# Patient Record
Sex: MALE
[De-identification: names, ages, dates, MRNs, and addresses within clinical notes are randomized per-mention and may not be internally consistent; named-entity substitution may affect disease eponyms.]

---

## 2022-01-03 ENCOUNTER — APPOINTMENT (OUTPATIENT)
Dept: OTOLARYNGOLOGY | Facility: CLINIC | Age: 27
End: 2022-01-03
Payer: COMMERCIAL

## 2022-01-03 VITALS
BODY MASS INDEX: 27.49 KG/M2 | SYSTOLIC BLOOD PRESSURE: 129 MMHG | HEART RATE: 63 BPM | WEIGHT: 192 LBS | HEIGHT: 70 IN | DIASTOLIC BLOOD PRESSURE: 79 MMHG | TEMPERATURE: 98 F | OXYGEN SATURATION: 97 %

## 2022-01-03 DIAGNOSIS — Z82.49 FAMILY HISTORY OF ISCHEMIC HEART DISEASE AND OTHER DISEASES OF THE CIRCULATORY SYSTEM: ICD-10-CM

## 2022-01-03 DIAGNOSIS — F17.290 NICOTINE DEPENDENCE, OTHER TOBACCO PRODUCT, UNCOMPLICATED: ICD-10-CM

## 2022-01-03 DIAGNOSIS — J03.90 ACUTE TONSILLITIS, UNSPECIFIED: ICD-10-CM

## 2022-01-03 DIAGNOSIS — Z82.3 FAMILY HISTORY OF STROKE: ICD-10-CM

## 2022-01-03 DIAGNOSIS — H61.22 IMPACTED CERUMEN, LEFT EAR: ICD-10-CM

## 2022-01-03 DIAGNOSIS — Z72.89 OTHER PROBLEMS RELATED TO LIFESTYLE: ICD-10-CM

## 2022-01-03 DIAGNOSIS — Z78.9 OTHER SPECIFIED HEALTH STATUS: ICD-10-CM

## 2022-01-03 PROBLEM — Z00.00 ENCOUNTER FOR PREVENTIVE HEALTH EXAMINATION: Status: ACTIVE | Noted: 2022-01-03

## 2022-01-03 PROCEDURE — 99204 OFFICE O/P NEW MOD 45 MIN: CPT | Mod: 25

## 2022-01-03 PROCEDURE — 69210 REMOVE IMPACTED EAR WAX UNI: CPT

## 2022-01-03 PROCEDURE — 31231 NASAL ENDOSCOPY DX: CPT | Mod: 52

## 2022-01-03 RX ORDER — AMOXICILLIN AND CLAVULANATE POTASSIUM 875; 125 MG/1; MG/1
875-125 TABLET, COATED ORAL
Qty: 20 | Refills: 0 | Status: ACTIVE | COMMUNITY
Start: 2022-01-03 | End: 1900-01-01

## 2022-01-03 RX ORDER — FLUTICASONE PROPIONATE 50 UG/1
50 SPRAY, METERED NASAL DAILY
Qty: 1 | Refills: 3 | Status: ACTIVE | COMMUNITY
Start: 2022-01-03 | End: 1900-01-01

## 2022-01-03 RX ORDER — OMEPRAZOLE 40 MG/1
40 CAPSULE, DELAYED RELEASE ORAL
Qty: 30 | Refills: 0 | Status: ACTIVE | COMMUNITY
Start: 2022-01-03 | End: 1900-01-01

## 2022-01-03 NOTE — REASON FOR VISIT
[Initial Evaluation] : an initial evaluation for [FreeTextEntry2] : nasal obstruction, possible sleep apnea, snoring

## 2022-01-03 NOTE — PROCEDURE
[Cerumen Impaction] : Cerumen Impaction [Same] : same as the Pre Op Dx. [] : Removal of Cerumen [Complicated Symptoms] : complicated symptoms requiring more thorough examination than provided by mirror [Interarytenoid Edema] : interarytenoid area edematous [Anterior rhinoscopy insufficient to account for symptoms] : anterior rhinoscopy insufficient to account for symptoms [None] : none [Flexible Endoscope] : examined with the flexible endoscope [Deviated to the Rt] : deviated to the right [Normal] : posterior cricoid area had healthy pink mucosa in the interarytenoid area and the esophageal inlet [Posterior Lesion] : posterior lesion [Serial Number: ___] : Serial Number: [unfilled] [FreeTextEntry6] : done for nasal obstruction; also advanced to larynx for ady - iah cw rl\par lingual tonsillitis  [de-identified] : lingual tonsillitis, interarytenoid edema  [FreeTextEntry5] : R nl, L copious cerumen removed atraumatically with suction, TMs intact

## 2022-01-03 NOTE — PHYSICAL EXAM
[Midline] : trachea located in midline position [Normal] : no rashes [Nasal Endoscopy Performed] : nasal endoscopy was performed, see procedure section for findings [de-identified] : R nl, L copious cerumen removed atraumatically with suction, TMs intact [de-identified] : R nl, L copious cerumen removed atraumatically with suction, TMs intact [de-identified] : Uvula to base of tongue  [de-identified] : gait steady

## 2022-01-03 NOTE — HISTORY OF PRESENT ILLNESS
[de-identified] : 25 y/o M presenting today with sx of nasal obstruction since summer 2018. No inciting event.  Over this past year his nasal obstruction has been a lot more bothersome, worse on R side than L. He initially saw an ENT at [place] who rec septoplasty, and is scheduled for sometime later this month. A second ent told him his nasal problem was different and septoplasty would not help. Pt also complaining that he feels fatigued during the day and like he is not functioning at his fullest capacity. Pt admits to snoring and was told his snoring has gotten louder and worse over the past year. Sometimes he wakes up in the middle of the night choking/ unable to catch his breath/ feeling like someone has been strangling him and he wakes up in the morning with a headache.  He has had some recent weight gain in past 2 months, but has been working out. No new life stressors. No FH pertinent to cc. Former smoker 5 pack years, quit when he was 22. No stomach upset. Denies fevers, chills, sweats.

## 2022-01-03 NOTE — ASSESSMENT
[FreeTextEntry1] : 1. lingual tonsillitis\par -augmentin\par 2. Reflux Laryngitis\par -diet/ mechanical precautions\par -Prilosec \par 3.ady\par -sleep study \par 4.deviated septum to R\par -ct scan, rec pt hold off on outside septoplasty until he has imaging of sinuses, pt agrees\par -flonase\par RTC with CT/ sleep study to review findings \par cerumen removed\par \par

## 2022-01-06 ENCOUNTER — APPOINTMENT (OUTPATIENT)
Dept: OTOLARYNGOLOGY | Facility: CLINIC | Age: 27
End: 2022-01-06
Payer: COMMERCIAL

## 2022-01-06 VITALS
WEIGHT: 192 LBS | DIASTOLIC BLOOD PRESSURE: 84 MMHG | BODY MASS INDEX: 27.49 KG/M2 | OXYGEN SATURATION: 99 % | HEIGHT: 70 IN | TEMPERATURE: 98 F | HEART RATE: 56 BPM | SYSTOLIC BLOOD PRESSURE: 130 MMHG

## 2022-01-06 DIAGNOSIS — R06.83 SNORING: ICD-10-CM

## 2022-01-06 DIAGNOSIS — G47.33 OBSTRUCTIVE SLEEP APNEA (ADULT) (PEDIATRIC): ICD-10-CM

## 2022-01-06 PROCEDURE — 99214 OFFICE O/P EST MOD 30 MIN: CPT

## 2022-01-06 NOTE — REASON FOR VISIT
[Subsequent Evaluation] : a subsequent evaluation for [FreeTextEntry2] : nasal obstruction, ady, reflux laryngiis

## 2022-01-06 NOTE — ASSESSMENT
[FreeTextEntry1] : ds/ar - I recommended flonase, smr turbs\par technique risks benefits and alts discussed\par he will consider and call if interested\par \par ady continue diet mech omep for reflux\par rtc with sleep study results

## 2022-01-06 NOTE — DATA REVIEWED
[de-identified] : ct reviewed with pt - ds ant to r posterior to l and turb engorgement, absent r frontal sinus

## 2022-01-06 NOTE — HISTORY OF PRESENT ILLNESS
[de-identified] : followup 25 yo m with nasal obstruction, reflux laryngitis, snoring. He had sinus ct and is here to review results. He is scheduled for sleep study. He has seen an ent who recommends smr and a plastic surgeon who recommended smr and repair of nasal valves.

## 2022-01-06 NOTE — PHYSICAL EXAM
[Normal] : no neck adenopathy [de-identified] : shelby vallejo; ar [de-identified] : gait steady

## 2022-01-27 ENCOUNTER — APPOINTMENT (OUTPATIENT)
Dept: SLEEP CENTER | Facility: HOME HEALTH | Age: 27
End: 2022-01-27
Payer: COMMERCIAL

## 2022-01-27 ENCOUNTER — OUTPATIENT (OUTPATIENT)
Dept: OUTPATIENT SERVICES | Facility: HOSPITAL | Age: 27
LOS: 1 days | End: 2022-01-27
Payer: COMMERCIAL

## 2022-01-27 PROCEDURE — 95800 SLP STDY UNATTENDED: CPT | Mod: 26

## 2022-01-27 PROCEDURE — 95800 SLP STDY UNATTENDED: CPT

## 2022-01-28 DIAGNOSIS — G47.33 OBSTRUCTIVE SLEEP APNEA (ADULT) (PEDIATRIC): ICD-10-CM

## 2022-02-04 ENCOUNTER — NON-APPOINTMENT (OUTPATIENT)
Age: 27
End: 2022-02-04

## 2022-02-16 ENCOUNTER — APPOINTMENT (OUTPATIENT)
Dept: OTOLARYNGOLOGY | Facility: CLINIC | Age: 27
End: 2022-02-16
Payer: COMMERCIAL

## 2022-02-16 VITALS
TEMPERATURE: 98.3 F | OXYGEN SATURATION: 99 % | HEART RATE: 62 BPM | SYSTOLIC BLOOD PRESSURE: 129 MMHG | DIASTOLIC BLOOD PRESSURE: 83 MMHG

## 2022-02-16 DIAGNOSIS — J04.0 ACUTE LARYNGITIS: ICD-10-CM

## 2022-02-16 DIAGNOSIS — K21.9 ACUTE LARYNGITIS: ICD-10-CM

## 2022-02-16 DIAGNOSIS — J34.3 HYPERTROPHY OF NASAL TURBINATES: ICD-10-CM

## 2022-02-16 DIAGNOSIS — J34.2 DEVIATED NASAL SEPTUM: ICD-10-CM

## 2022-02-16 PROCEDURE — 99214 OFFICE O/P EST MOD 30 MIN: CPT

## 2022-02-16 NOTE — REASON FOR VISIT
[Subsequent Evaluation] : a subsequent evaluation for [FreeTextEntry2] : deviated septum perceived ady snoring, reflux laryngitis

## 2022-02-16 NOTE — PHYSICAL EXAM
[] : septum deviated bilaterally [Normal] : assessment of respiratory effort is normal [de-identified] : mild valves [de-identified] : gait steady

## 2022-02-16 NOTE — HISTORY OF PRESENT ILLNESS
[de-identified] : 6 week followup for this 25 yo M with deviate septum, nasal obstruction, reflux laryngitis, snoring, perceived ady. He had had sleep study and is here to review result. He reports he is breathing much better at night on reflux therapy. He continues to have nasal obstruction. Fractured nose in rugby game many yrs ago.

## 2022-02-16 NOTE — ASSESSMENT
[FreeTextEntry1] : snoring better, no asa reflux improved - continue present regimen\par ds/inf turb hypertrophy\par discussed risks benefits and alts to septum/turbs - he also is considering whether to do nasal valve surgery - I asked staff to give him contact info for Dr Staley\par He said he would call if he wishes to do smr/turbs

## 2022-02-16 NOTE — DATA REVIEWED
[de-identified] : ct images and report reviewed with pt [de-identified] : negative, reviewed with pt

## 2022-04-11 PROBLEM — J04.0 REFLUX LARYNGITIS: Status: ACTIVE | Noted: 2022-01-03

## 2022-04-26 ENCOUNTER — TRANSCRIPTION ENCOUNTER (OUTPATIENT)
Age: 27
End: 2022-04-26

## 2022-05-25 ENCOUNTER — NON-APPOINTMENT (OUTPATIENT)
Age: 27
End: 2022-05-25

## 2023-10-06 ENCOUNTER — APPOINTMENT (OUTPATIENT)
Dept: OTOLARYNGOLOGY | Facility: CLINIC | Age: 28
End: 2023-10-06

## 2023-10-13 ENCOUNTER — APPOINTMENT (OUTPATIENT)
Dept: OTOLARYNGOLOGY | Facility: CLINIC | Age: 28
End: 2023-10-13
Payer: SELF-PAY

## 2023-10-13 VITALS
HEIGHT: 70 IN | WEIGHT: 192 LBS | OXYGEN SATURATION: 97 % | BODY MASS INDEX: 27.49 KG/M2 | SYSTOLIC BLOOD PRESSURE: 116 MMHG | HEART RATE: 64 BPM | TEMPERATURE: 97.9 F | DIASTOLIC BLOOD PRESSURE: 72 MMHG

## 2023-10-13 DIAGNOSIS — J34.89 OTHER SPECIFIED DISORDERS OF NOSE AND NASAL SINUSES: ICD-10-CM

## 2023-10-13 PROCEDURE — 99213 OFFICE O/P EST LOW 20 MIN: CPT | Mod: NC

## 2024-03-15 ENCOUNTER — APPOINTMENT (OUTPATIENT)
Dept: OTOLARYNGOLOGY | Facility: CLINIC | Age: 29
End: 2024-03-15

## 2024-09-26 ENCOUNTER — APPOINTMENT (OUTPATIENT)
Dept: OTOLARYNGOLOGY | Facility: CLINIC | Age: 29
End: 2024-09-26

## 2024-10-04 ENCOUNTER — APPOINTMENT (OUTPATIENT)
Dept: OTOLARYNGOLOGY | Facility: CLINIC | Age: 29
End: 2024-10-04
Payer: SELF-PAY

## 2024-10-04 VITALS
HEIGHT: 70 IN | WEIGHT: 210 LBS | HEART RATE: 69 BPM | OXYGEN SATURATION: 97 % | SYSTOLIC BLOOD PRESSURE: 134 MMHG | BODY MASS INDEX: 30.06 KG/M2 | DIASTOLIC BLOOD PRESSURE: 86 MMHG

## 2024-10-04 DIAGNOSIS — R68.89 OTHER GENERAL SYMPTOMS AND SIGNS: ICD-10-CM

## 2024-10-04 PROCEDURE — 99212 OFFICE O/P EST SF 10 MIN: CPT

## 2024-10-05 PROBLEM — R68.89 EYE, EAR, NOSE, AND THROAT SYMPTOM: Status: ACTIVE | Noted: 2024-10-05

## 2024-10-05 NOTE — HISTORY OF PRESENT ILLNESS
[de-identified] : 1 year follow up visit for this 30 y/o M with h/o septorhinoplasty (April 2022, Dr. Flores). He is concerned that too much of his anterior septal cartilage was removed resulting in R nasal valve collapse.  He said that he spoke with multiple plastic surgeons who were not able to explain why he has a R nasal valve collapse. He requests that I look at his CT to determine integrity of his septum. I explained I could look at his ct but am not sure I could see the anterior septum issue he was referring to but would try. He asked me to hold off as he did not want to incur costs unless I could guarantee a result. offered referral to a plastic surgeon as his complaint is nasal collapse and valving. He preferred not to be examined if there would be costs incurred.

## 2024-10-05 NOTE — ASSESSMENT
[FreeTextEntry1] : -provided recommendation to Dr. Radha Hoskins for re-evaluation as he requested plastic surgeon's eval no

## 2024-10-05 NOTE — HISTORY OF PRESENT ILLNESS
[de-identified] : 1 year follow up visit for this 28 y/o M with h/o septorhinoplasty (April 2022, Dr. Flores). He is concerned that too much of his anterior septal cartilage was removed resulting in R nasal valve collapse.  He said that he spoke with multiple plastic surgeons who were not able to explain why he has a R nasal valve collapse. He requests that I look at his CT to determine integrity of his septum. I explained I could look at his ct but am not sure I could see the anterior septum issue he was referring to but would try. He asked me to hold off as he did not want to incur costs unless I could guarantee a result. offered referral to a plastic surgeon as his complaint is nasal collapse and valving. He preferred not to be examined if there would be costs incurred.

## 2024-10-05 NOTE — REASON FOR VISIT
[Subsequent Evaluation] : a subsequent evaluation for [FreeTextEntry2] : post septorhinoplasty concerns

## 2024-10-05 NOTE — ASSESSMENT
[FreeTextEntry1] : -provided recommendation to Dr. Radha Hoskins for re-evaluation as he requested plastic surgeon's eval

## 2024-10-07 ENCOUNTER — NON-APPOINTMENT (OUTPATIENT)
Age: 29
End: 2024-10-07

## 2024-10-07 ENCOUNTER — APPOINTMENT (OUTPATIENT)
Dept: OTOLARYNGOLOGY | Facility: CLINIC | Age: 29
End: 2024-10-07
Payer: COMMERCIAL

## 2024-10-07 VITALS
SYSTOLIC BLOOD PRESSURE: 161 MMHG | HEIGHT: 70 IN | OXYGEN SATURATION: 97 % | WEIGHT: 210 LBS | TEMPERATURE: 97.8 F | BODY MASS INDEX: 30.06 KG/M2 | HEART RATE: 82 BPM | DIASTOLIC BLOOD PRESSURE: 107 MMHG

## 2024-10-07 DIAGNOSIS — Z98.890 OTHER SPECIFIED POSTPROCEDURAL STATES: ICD-10-CM

## 2024-10-07 PROCEDURE — 99205 OFFICE O/P NEW HI 60 MIN: CPT
